# Patient Record
Sex: MALE
[De-identification: names, ages, dates, MRNs, and addresses within clinical notes are randomized per-mention and may not be internally consistent; named-entity substitution may affect disease eponyms.]

---

## 2018-07-30 ENCOUNTER — HOSPITAL ENCOUNTER (EMERGENCY)
Dept: HOSPITAL 25 - UCEAST | Age: 33
Discharge: HOME | End: 2018-07-30
Payer: SELF-PAY

## 2018-07-30 DIAGNOSIS — F17.200: ICD-10-CM

## 2018-07-30 DIAGNOSIS — K04.7: Primary | ICD-10-CM

## 2018-07-30 PROCEDURE — 99202 OFFICE O/P NEW SF 15 MIN: CPT

## 2018-07-30 PROCEDURE — G0463 HOSPITAL OUTPT CLINIC VISIT: HCPCS

## 2018-07-30 NOTE — UC
Dental HPI





- HPI Summary


HPI Summary: 





This is juma Corneliusfazal Hopson documenting for attending Trixie Grey MD.





This patient is a 33 year old M [place] [facility] [place] family 

member with a chief complaint of right lower dental pain that began 3 weeks 

ago. The patient rates the pain 10/10 in severity. Symptoms aggravated by hot/

cold. Symptoms alleviated by nothing, pt took ibuprofen without relief this 

morning. Patient reports ear pain. Patient denies fever and n/v. Pt states his 

teeth are broken off in his gums. Pt has a dentist appointment in [place]  

tomorrow. Pt doesn't speak English well so his family member translated.  

Patient declined translating service





Patients medication reviewed this visit.   





- History of Current Complaint


Chief Complaint: UCDentalProblem


Stated Complaint: DENTAL PAIN


Time Seen by Provider: 07/30/18 15:39


Hx Obtained From: Patient, Family/Caretaker


Onset/Duration: Lasting Weeks, Still Present


Severity: Severe


Pain Intensity: 10


Pain Scale Used: 0-10 Numeric


Aggravating Factor(s): Heat, Cold


Alleviating Factor(s): Nothing





- Allergies/Home Medications


Allergies/Adverse Reactions: 


 Allergies











Allergy/AdvReac Type Severity Reaction Status Date / Time


 


No Known Allergies Allergy   Verified 07/30/18 15:45











Home Medications: 


 Home Medications





Ibuprofen [Advil] 400 mg PO Q6HR PRN 07/30/18 [History Confirmed 07/30/18]











PMH/Surg Hx/FS Hx/Imm Hx


Previously Healthy: Yes


Other History Of: 


   Negative For: Hepatitis B, Hepatitis C, Anticoagulant Therapy





- Surgical History


Surgical History: None





- Family History


Known Family History: Positive: Diabetes





- Social History


Occupation: Employed Full-time


Lives: With Family


Alcohol Use: Occasionally


Substance Use Type: None


Smoking Status (MU): Heavy Every Day Tobacco Smoker





Review of Systems


Constitutional: Negative - fever


ENT: Dental Pain, Ear Ache


All Other Systems Reviewed And Are Negative: Yes





Physical Exam





- Summary


Physical Exam Summary: 





Vital Signs Reviewed: Yes


A+Ox3, no distress


Eyes: Conjunctiva Clear, COMFORT  EOM intact and full


ENT: Hearing grossly normal  TM x 2 clear  no fluid, no erythema  ucula midline

  no exudate, no erythema  Pt with poor dentition #29/30 broken at gumline + 

erythema + TTP  no fluctance, no discharge + TTP  mild edema right buccal 

membrane


neck: supple\, no lymphadenopathy


Respiratory: Positive: No respiratory distress, No accessory muscle use  CTA 

throughout no w/r


Cardiovascular: skin color reflect adequate perfusion RRR nl s1 s2  no m/r


Musculoskeletal Exam: MORAN x 4 without difficulty 


Neurological: Positive: Alert,  ambulatory without difficulty


Psychological: Positive: Normal Response To Family


Skin: Positive: no rash, no ecchymosis





Triage Information Reviewed: Yes


Vital Signs: 


 Initial Vital Signs











Temp  99.3 F   07/30/18 15:46


 


Pulse  64   07/30/18 15:46


 


Resp  18   07/30/18 15:46


 


BP  142/83   07/30/18 15:46


 


Pulse Ox  99   07/30/18 15:46














Dental Complaint Course/Dx





- Course


Course Of Treatment: Blood pressure noted and patient informed to follow up 

with PCP.  Patient presents with 3 weeks of progressive pain in his right lower 

teeth.  Patient will continue to the gumline.  Patient here working from out of 

town.  Patient has a plant with a dentist tomorrow.  Patient took 1 dose of 

Motrin this morning with little weak.  Patient with dental caries and likely 

abscess.  Patient given a prescription for Augmentin.  Motrin Tylenol for pain.

  Patient's with warm salt water.  Patient's to keep percent appointment 

tomorrow morning.  Patient comfortable and in agreement with plan.





- Differential Dx/Diagnosis


Provider Diagnoses: denal abscess





Discharge





- Sign-Out/Discharge


Documenting (check all that apply): Patient Departure





- Discharge Plan


Condition: Stable


Disposition: HOME


Prescriptions: 


Amoxicillin/Clavulanate TAB* [Augmentin *] 875 mg PO BID #20 tab


Patient Education Materials:  Dental Abscess (ED)


Referrals: 


No Primary Care Phys,NOPCP [Primary Care Provider] - 


Additional Instructions: 


- Okay to alternate ibuprofen (Advil, Motrin)600mg  and Tylenol 1000mg every 3 

hours for pain. Take with food. Do NOT take for more than 4-5 days


-Swish and spit with warm salt water 3-4 times a day


-Take anitbiotics as prescribed until gone 


-Stay well hydrated - frequent sips of cold fluids will be soothing to your 

throat (popsicles, jello, ice cream, ice water)


- Okay to use over the counter ambusol every4-6 ours for pain. 


- Keep your appointment with your dentist tomorrow as scheduled 





- Billing Disposition and Condition


Condition: STABLE


Disposition: Home